# Patient Record
Sex: FEMALE | Race: WHITE | URBAN - METROPOLITAN AREA
[De-identification: names, ages, dates, MRNs, and addresses within clinical notes are randomized per-mention and may not be internally consistent; named-entity substitution may affect disease eponyms.]

---

## 2021-06-23 ENCOUNTER — OFFICE VISIT (OUTPATIENT)
Dept: INTERNAL MEDICINE CLINIC | Age: 21
End: 2021-06-23
Payer: COMMERCIAL

## 2021-06-23 VITALS
SYSTOLIC BLOOD PRESSURE: 132 MMHG | OXYGEN SATURATION: 99 % | HEIGHT: 61 IN | DIASTOLIC BLOOD PRESSURE: 78 MMHG | HEART RATE: 75 BPM | RESPIRATION RATE: 16 BRPM | BODY MASS INDEX: 31.53 KG/M2 | WEIGHT: 167 LBS

## 2021-06-23 DIAGNOSIS — L65.9 HAIR LOSS: Primary | ICD-10-CM

## 2021-06-23 DIAGNOSIS — N92.6 IRREGULAR PERIODS: ICD-10-CM

## 2021-06-23 DIAGNOSIS — K59.01 SLOW TRANSIT CONSTIPATION: ICD-10-CM

## 2021-06-23 PROCEDURE — 99203 OFFICE O/P NEW LOW 30 MIN: CPT | Performed by: INTERNAL MEDICINE

## 2021-06-23 RX ORDER — ISOTRETINOIN 40 MG/1
40 CAPSULE ORAL DAILY
COMMUNITY

## 2021-06-23 SDOH — ECONOMIC STABILITY: INCOME INSECURITY: IN THE LAST 12 MONTHS, WAS THERE A TIME WHEN YOU WERE NOT ABLE TO PAY THE MORTGAGE OR RENT ON TIME?: NO

## 2021-06-23 SDOH — HEALTH STABILITY: PHYSICAL HEALTH: ON AVERAGE, HOW MANY DAYS PER WEEK DO YOU ENGAGE IN MODERATE TO STRENUOUS EXERCISE (LIKE A BRISK WALK)?: 4 DAYS

## 2021-06-23 SDOH — ECONOMIC STABILITY: TRANSPORTATION INSECURITY
IN THE PAST 12 MONTHS, HAS THE LACK OF TRANSPORTATION KEPT YOU FROM MEDICAL APPOINTMENTS OR FROM GETTING MEDICATIONS?: NO

## 2021-06-23 SDOH — HEALTH STABILITY: PHYSICAL HEALTH: ON AVERAGE, HOW MANY MINUTES DO YOU ENGAGE IN EXERCISE AT THIS LEVEL?: 30 MIN

## 2021-06-23 SDOH — ECONOMIC STABILITY: FOOD INSECURITY: WITHIN THE PAST 12 MONTHS, YOU WORRIED THAT YOUR FOOD WOULD RUN OUT BEFORE YOU GOT MONEY TO BUY MORE.: NEVER TRUE

## 2021-06-23 SDOH — ECONOMIC STABILITY: TRANSPORTATION INSECURITY
IN THE PAST 12 MONTHS, HAS LACK OF TRANSPORTATION KEPT YOU FROM MEETINGS, WORK, OR FROM GETTING THINGS NEEDED FOR DAILY LIVING?: NO

## 2021-06-23 SDOH — ECONOMIC STABILITY: FOOD INSECURITY: WITHIN THE PAST 12 MONTHS, THE FOOD YOU BOUGHT JUST DIDN'T LAST AND YOU DIDN'T HAVE MONEY TO GET MORE.: NEVER TRUE

## 2021-06-23 SDOH — ECONOMIC STABILITY: HOUSING INSECURITY
IN THE LAST 12 MONTHS, WAS THERE A TIME WHEN YOU DID NOT HAVE A STEADY PLACE TO SLEEP OR SLEPT IN A SHELTER (INCLUDING NOW)?: NO

## 2021-06-23 ASSESSMENT — SOCIAL DETERMINANTS OF HEALTH (SDOH)
HOW OFTEN DO YOU ATTENT MEETINGS OF THE CLUB OR ORGANIZATION YOU BELONG TO?: NEVER
WITHIN THE LAST YEAR, HAVE TO BEEN RAPED OR FORCED TO HAVE ANY KIND OF SEXUAL ACTIVITY BY YOUR PARTNER OR EX-PARTNER?: NO
HOW HARD IS IT FOR YOU TO PAY FOR THE VERY BASICS LIKE FOOD, HOUSING, MEDICAL CARE, AND HEATING?: NOT HARD AT ALL
DO YOU BELONG TO ANY CLUBS OR ORGANIZATIONS SUCH AS CHURCH GROUPS UNIONS, FRATERNAL OR ATHLETIC GROUPS, OR SCHOOL GROUPS?: NO
WITHIN THE LAST YEAR, HAVE YOU BEEN AFRAID OF YOUR PARTNER OR EX-PARTNER?: NO
WITHIN THE LAST YEAR, HAVE YOU BEEN KICKED, HIT, SLAPPED, OR OTHERWISE PHYSICALLY HURT BY YOUR PARTNER OR EX-PARTNER?: NO
WITHIN THE LAST YEAR, HAVE YOU BEEN HUMILIATED OR EMOTIONALLY ABUSED IN OTHER WAYS BY YOUR PARTNER OR EX-PARTNER?: NO
HOW OFTEN DO YOU ATTEND CHURCH OR RELIGIOUS SERVICES?: NEVER
HOW OFTEN DO YOU GET TOGETHER WITH FRIENDS OR RELATIVES?: THREE TIMES A WEEK
IN A TYPICAL WEEK, HOW MANY TIMES DO YOU TALK ON THE PHONE WITH FAMILY, FRIENDS, OR NEIGHBORS?: THREE TIMES A WEEK

## 2021-06-23 ASSESSMENT — PATIENT HEALTH QUESTIONNAIRE - PHQ9
DEPRESSION UNABLE TO ASSESS: YES
2. FEELING DOWN, DEPRESSED OR HOPELESS: NOT AT ALL
1. LITTLE INTEREST OR PLEASURE IN DOING THINGS: NOT AT ALL
SUM OF ALL RESPONSES TO PHQ9 QUESTIONS 1 & 2: 0
1. LITTLE INTEREST OR PLEASURE IN DOING THINGS: 0
SUM OF ALL RESPONSES TO PHQ QUESTIONS 1-9: 0
SUM OF ALL RESPONSES TO PHQ9 QUESTIONS 1 & 2: 0
2. FEELING DOWN, DEPRESSED OR HOPELESS: 0

## 2021-06-23 ASSESSMENT — LIFESTYLE VARIABLES: HOW OFTEN DO YOU HAVE A DRINK CONTAINING ALCOHOL: NEVER

## 2021-06-23 NOTE — PROGRESS NOTES
[facility] Primary Care  Internal Medicine  New Patient Note  Rocio Nataliemickie, DO      2021    Doreen Sotelo (:  2000) is a 22 y.o. female, here for evaluation of the following medical concerns:      SUBJECTIVE:    HPI     Patient is a 21 yo fm with pmhx of ADHD on no medications who presents secondary to hair loss constipation and to establish care. Patient has noted some acne (she is on accutane by dermatolgist) with constipation and hair loss. She note she saw endocrinology 2/2 this in the past and was told all her labs came back normal. She notes that her periods are also irregular. She will go 5-6 weeks between periods and then some times only 2 weeks. Because of this and her other symptoms she was concerned for PCOS. Patient has not seen a gynecologist.     Dr. Solange Leal pediatricaion --> vaccine hx can be obtain here  Has not had covid-19 vaccine but would like to get soon    She is otherwise feeling well except for the above symptoms. Review of Systems ROS negative except for those noted in the HPI above. Outpatient Medications Marked as Taking for the 22 encounter (Office Visit) with Power Stout, DO   Medication Sig Dispense Refill    ISOtretinoin (ACCUTANE) 40 MG chemo capsule Take 40 mg by mouth daily          No Known Allergies    Past Medical History:   Diagnosis Date    ADHD (attention deficit hyperactivity disorder)        No past surgical history on file.     Social History     Socioeconomic History    Marital status: Single     Spouse name: Not on file    Number of children: Not on file    Years of education: Not on file    Highest education level: Not on file   Occupational History    Not on file   Tobacco Use    Smoking status: Never Smoker    Smokeless tobacco: Never Used   Vaping Use    Vaping Use: Never used   Substance and Sexual Activity    Alcohol use: Never    Drug use: Never    Sexual activity: Never   Other Topics Concern    Not on file   Social History Narrative    Not on file     Social Determinants of Health     Financial Resource Strain: Low Risk     Difficulty of Paying Living Expenses: Not hard at all   Food Insecurity: No Food Insecurity    Worried About Running Out of Food in the Last Year: Never true    [address] in the Last Year: Never true   Transportation Needs: No Transportation Needs    Lack of Transportation (Medical): No    Lack of Transportation (Non-Medical): No   Physical Activity: Insufficiently Active    Days of Exercise per Week: 4 days    Minutes of Exercise per Session: 30 min   Stress: No Stress Concern Present    Feeling of Stress : Not at all   Social Connections: Unknown    Frequency of Communication with Friends and Family: Three times a week    Frequency of Social Gatherings with Friends and Family: Three times a week    Attends Mu-ism Services: Never    Active Member of Clubs or Organizations: No    Attends Club or Organization Meetings: Never    Marital Status: Not on file   Intimate Partner Violence: Not At Risk    Fear of Current or Ex-Partner: No    Emotionally Abused: No    Physically Abused: No    Sexually Abused: No        Family History   Problem Relation Age of Onset    Breast Cancer Paternal Grandmother          OBJECTIVE:    Vitals:    06/23/21 1104   BP: 132/78   Pulse: 75   Resp: 16   SpO2: 99%   Weight: 167 lb (75.8 kg)   Height: 5' 1\" (1.549 m)     Body mass index is 31.55 kg/m². Physical Exam  Constitutional:       General: She is not in acute distress. Appearance: Normal appearance. She is not ill-appearing. HENT:      Head: Normocephalic and atraumatic. Right Ear: External ear normal.      Left Ear: External ear normal.   Eyes:      General: No scleral icterus. Extraocular Movements: Extraocular movements intact. Conjunctiva/sclera: Conjunctivae normal.   Cardiovascular:      Rate and Rhythm: Normal rate and regular rhythm. Pulses: Normal pulses. Heart sounds: No murmur heard. No friction rub. No gallop. Pulmonary:      Effort: Pulmonary effort is normal. No respiratory distress. Breath sounds: Normal breath sounds. No wheezing, rhonchi or rales. Abdominal:      General: Bowel sounds are normal. There is no distension. Palpations: Abdomen is soft. There is no mass. Tenderness: There is no abdominal tenderness. There is no guarding or rebound. Musculoskeletal:         General: No swelling or tenderness. Normal range of motion. Cervical back: Normal range of motion. No rigidity. No muscular tenderness. Lymphadenopathy:      Cervical: No cervical adenopathy. Skin:     General: Skin is warm. Coloration: Skin is not jaundiced. Findings: No bruising, erythema or rash. Neurological:      General: No focal deficit present. Mental Status: She is alert and oriented to person, place, and time. Motor: No weakness. Gait: Gait normal.   Psychiatric:         Mood and Affect: Mood normal.         Behavior: Behavior normal.         ASSESSMENT/PLAN:  1. Hair loss  Hair loss with constipation some fatigue and irregular periods. Will check TSH. Patient also concerned for PCOS, will give referral to gyn for further workup and treatment.   - TSH with Reflex; Future    2. Slow transit constipation  Will check tsh. Patient to start taking miralax more regularly. She has this already at home and has used it in the past.   - TSH with Reflex; Future    3. Irregular periods  Will check thyroid studies, referral to gyn for further evaluation. 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 Return in about 1 year (around 6/23/2022), or if symptoms worsen or fail to improve.      The DO Lewis

## 2021-06-24 DIAGNOSIS — N92.6 IRREGULAR PERIODS: Primary | ICD-10-CM

## 2021-10-20 ENCOUNTER — TELEPHONE (OUTPATIENT)
Dept: INTERNAL MEDICINE CLINIC | Age: 21
End: 2021-10-20

## 2021-10-20 NOTE — TELEPHONE ENCOUNTER
Unfortunately patient will need to schedule an appointment for this.  It has been 3 months since I saw her last.

## 2021-10-20 NOTE — TELEPHONE ENCOUNTER
Per Dr. Regino Falcon, this patient needs to be scheduled for an appointment to get her health forms completed.     Keisha Delatorre

## 2021-10-20 NOTE — TELEPHONE ENCOUNTER
----- Message from Armando Allison sent at 10/20/2021 11:25 AM EDT -----  Subject: Message to Provider    QUESTIONS  Information for Provider? patient has health forms that needs to have   filled out she wants to know if she can drop them off and if her last   appointment could be used as a physical. she also needs a drug test done   and wants to know if she can do that with us   ---------------------------------------------------------------------------  --------------  CALL BACK INFO  What is the best way for the office to contact you? OK to leave message on   voicemail  Preferred Call Back Phone Number? [phone]  ---------------------------------------------------------------------------  --------------  SCRIPT ANSWERS  Relationship to Patient?  Self

## 2021-11-08 ENCOUNTER — OFFICE VISIT (OUTPATIENT)
Dept: INTERNAL MEDICINE CLINIC | Age: 21
End: 2021-11-08
Payer: COMMERCIAL

## 2021-11-08 VITALS
HEART RATE: 73 BPM | SYSTOLIC BLOOD PRESSURE: 118 MMHG | WEIGHT: 165 LBS | BODY MASS INDEX: 31.15 KG/M2 | RESPIRATION RATE: 16 BRPM | HEIGHT: 61 IN | DIASTOLIC BLOOD PRESSURE: 70 MMHG | OXYGEN SATURATION: 98 %

## 2021-11-08 DIAGNOSIS — Z00.00 ANNUAL PHYSICAL EXAM: Primary | ICD-10-CM

## 2021-11-08 PROCEDURE — 99395 PREV VISIT EST AGE 18-39: CPT | Performed by: INTERNAL MEDICINE

## 2021-11-08 NOTE — PROGRESS NOTES
DO Seth  Internal Medicine  Annual Physical/Preventative Medicine Evaluation      Gerda Walsh  YOB: 2000    Date of Service:  11/8/2021    Vitals:    11/08/21 1032   BP: 118/70   Pulse: 73   Resp: 16   SpO2: 98%   Weight: 165 lb (74.8 kg)   Height: 5' 1\" (1.549 m)     Body mass index is 31.18 kg/m². Wt Readings from Last 3 Encounters:   11/08/21 165 lb (74.8 kg)   06/23/21 167 lb (75.8 kg)     BP Readings from Last 3 Encounters:   11/08/21 118/70   06/23/21 132/78       Chief Estefani Bletran is a 22 y.o. female who presents for complete physical examination/Preventative Medicine Evaluation. HPI: Patient is a 72-year-old female with no significant past medical history who presents today for annual physical.    Patient notes that she did see gynecology for concerns of hair loss and questionable PCOS. Patient notes that she does not have a true diagnosis of PCOS at this time. To follow-up with gynecology per them. Patient feels well without any complaints today. 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 There is no problem list on file for this patient. No Known Allergies  Outpatient Medications Marked as Taking for the 11/8/21 encounter (Office Visit) with Neftaly Benavides DO   Medication Sig Dispense Refill    ISOtretinoin (ACCUTANE) 40 MG chemo capsule Take 40 mg by mouth daily         Past Medical History:   Diagnosis Date    ADHD (attention deficit hyperactivity disorder)      No past surgical history on file.   Family History   Problem Relation Age of Onset    Breast Cancer Paternal Grandmother        Preventive Care:  Hx abnormal PAP:patient not of age for PAP  Self-breast exams: no  Eye exam within the past 2 years: no - does wear contacts   Dental exam every 6 months: yes  Seatbelt used consistently: yes  Smoke and carbon monoxidedetectors in home: yes   Exercise: walking 30 minutes/day- 4 days/week Never       Advance Directive: N, <no information>    Immunization History   Administered Date(s) Administered    DTaP, 5 Pertussis Antigens (Daptacel) 2001, 2001, 2001, 2001, 2001, 2001, 02/19/2003, 02/19/2003, 08/02/2006, 08/02/2006    HIB PRP-T (ActHIB, Hiberix) 2001, 2001, 2001, 2001, 2001, 2001, 2001, 2001    Hepatitis A Ped/Adol (Havrix, Vaqta) 12/28/2018, 12/28/2018, 08/24/2020, 08/24/2020    Hepatitis B Ped/Adol (Engerix-B, Recombivax HB) 2001, 2001, 2001, 2001, 2001, 2001    Hib (HbOC) 06/10/2002    MMR 08/28/2002, 08/28/2002, 08/02/2006, 08/02/2006    Meningococcal MCV4P (Menactra) 04/15/2013, 04/15/2013, 12/11/2017, 12/11/2017    Pneumococcal Conjugate 7-valent (Martin Cocking) 2001, 2001, 2001, 2001, 03/11/2002, 03/11/2002, 08/28/2002, 08/28/2002    Polio IPV (IPOL) 2001, 2001, 2001, 2001, 02/19/2003, 02/19/2003, 08/02/2013, 08/02/2013    Tdap (Boostrix, Adacel) 04/15/2013, 04/15/2013    Varicella (Varivax) 06/10/2002, 06/10/2002, 07/07/2008, 07/07/2008       Health Maintenance   Topic Date Due    Hepatitis C screen  Never done    HPV vaccine (1 - 2-dose series) Never done    COVID-19 Vaccine (1) Never done    HIV screen  Never done    Chlamydia screen  Never done    Hepatitis A vaccine (2 of 2 - 2-dose series) 02/24/2021    Flu vaccine (1) Never done    DTaP/Tdap/Td vaccine (7 - Td or Tdap) 04/15/2023    Hepatitis B vaccine  Completed    Hib vaccine  Completed    Varicella vaccine  Completed    Meningococcal (ACWY) vaccine  Completed    Pneumococcal 0-64 years Vaccine  Aged Out          Review of Systems:  Review of Systems ROS negative except for those noted in the HPI above.        Objective    Vitals:    11/08/21 1032   BP: 118/70   Pulse: 73   Resp: 16   SpO2: 98%   Weight: 165 lb (74.8 kg)   Height: 5' 1\" (1.549 m) Body mass index is 31.18 kg/m². Wt Readings from Last 3 Encounters:   11/08/21 165 lb (74.8 kg)   06/23/21 167 lb (75.8 kg)     BP Readings from Last 3 Encounters:   11/08/21 118/70   06/23/21 132/78         Physical Exam  Constitutional:       General: She is not in acute distress. Appearance: Normal appearance. She is not ill-appearing. HENT:      Head: Normocephalic and atraumatic. Right Ear: External ear normal.      Left Ear: External ear normal.   Eyes:      General: No scleral icterus. Extraocular Movements: Extraocular movements intact. Conjunctiva/sclera: Conjunctivae normal.   Cardiovascular:      Rate and Rhythm: Normal rate and regular rhythm. Pulses: Normal pulses. Heart sounds: No murmur heard. No friction rub. No gallop. Pulmonary:      Effort: Pulmonary effort is normal. No respiratory distress. Breath sounds: Normal breath sounds. No wheezing, rhonchi or rales. Abdominal:      General: Bowel sounds are normal. There is no distension. Palpations: Abdomen is soft. There is no mass. Tenderness: There is no abdominal tenderness. There is no guarding or rebound. Musculoskeletal:      Cervical back: Normal range of motion. No muscular tenderness. Right lower leg: No edema. Left lower leg: No edema. Lymphadenopathy:      Cervical: No cervical adenopathy. Skin:     General: Skin is warm. Findings: No erythema or rash. Neurological:      General: No focal deficit present. Mental Status: She is alert and oriented to person, place, and time. Psychiatric:         Mood and Affect: Mood normal.         Behavior: Behavior normal.         Lipid panel: No results found for: CHOL, TRIG, HDL, LDLCALC, LDLDIRECT  Glucose: No results found for: GLUCOSE    No results found for this visit on 11/08/21.     Assessment/Plan:  Jaylyn Lemus was seen today for annual exam.    Diagnoses and all orders for this visit:    Annual physical exam  Patient eating healthy diet and exercising frequently  Patient is using sunscreen consistently  Seatbelt use yes  Home safety reviewed including fire alarms and carbon monoxide alarms in the home  Will obtain QuantiFERON gold test secondary to requirement for nursing school  Patient is due for COVID-19 vaccine and flu vaccine -planning to get Covid vaccine this week. Patient would like to hold on flu until after Covid vaccine. Patient denies HIV and hepatitis C screening today  Paperwork completed  -     Quantiferon TB Gold; Future          No follow-ups on file.     The DO Lewis

## 2021-11-15 ENCOUNTER — TELEPHONE (OUTPATIENT)
Dept: INTERNAL MEDICINE CLINIC | Age: 21
End: 2021-11-15

## 2021-11-15 NOTE — TELEPHONE ENCOUNTER
----- Message from Alma Welsh sent at 11/15/2021 10:37 AM EST -----  Subject: Message to Provider    QUESTIONS  Information for Provider? Pt called and said she is wondering if her   physical form and [employer] respirator form is ready to be picked up. SHe said   it was due today 11/15.  ---------------------------------------------------------------------------  --------------  CALL BACK INFO  What is the best way for the office to contact you? OK to leave message on   voicemail  Preferred Call Back Phone Number? [phone]  ---------------------------------------------------------------------------  --------------  SCRIPT ANSWERS  Relationship to Patient?  Self

## 2021-11-19 ENCOUNTER — TELEPHONE (OUTPATIENT)
Dept: INTERNAL MEDICINE CLINIC | Age: 21
End: 2021-11-19

## 2022-02-14 ENCOUNTER — TELEPHONE (OUTPATIENT)
Dept: INTERNAL MEDICINE CLINIC | Age: 22
End: 2022-02-14

## 2022-02-14 NOTE — TELEPHONE ENCOUNTER
Patient is calling back about a form from [id number]. There is an encounter in her chart she has come pick this forms up but now she states she needs to have the [employer] form signed by the doctor as she has just now discovered that it was not signed. Please advise as [place] and would need this signed and scanned back to her chart.

## 2022-02-14 NOTE — TELEPHONE ENCOUNTER
Left message for patient to call. If patient calls I need more information.   Exactly what page needs a signature because if you look in the media section all signature lines are signed

## 2022-02-14 NOTE — TELEPHONE ENCOUNTER
Patient called back stating that she is not exactly sure what they are asking for but they told her to bring the [employer] form back signed by her provider. She will reach out to their office to see exactly what they need and give us a call back.

## 2022-11-21 ENCOUNTER — NURSE ONLY (OUTPATIENT)
Dept: INTERNAL MEDICINE CLINIC | Age: 22
End: 2022-11-21
Payer: COMMERCIAL

## 2022-11-21 ENCOUNTER — TELEPHONE (OUTPATIENT)
Dept: INTERNAL MEDICINE CLINIC | Age: 22
End: 2022-11-21

## 2022-11-21 PROCEDURE — 86580 TB INTRADERMAL TEST: CPT | Performed by: INTERNAL MEDICINE

## 2022-11-21 NOTE — TELEPHONE ENCOUNTER
Patient requesting an appt for a TB test.  Would need to be today so it can be read on Wednesday. Ok to schedule?      ----- Message from Aaron Miller sent at 11/21/2022  8:54 AM EST -----  Subject: Appointment Request    Reason for Call: Established Patient Appointment needed: Urgent (Patient   Request) No Script    QUESTIONS    Reason for appointment request? No appointments available during search     Additional Information for Provider? patient stated that she needs a TB test for school .  She will only be in town until next Monday.  ---------------------------------------------------------------------------  --------------  [id number]  [phone]; OK to leave message on voicemail  ---------------------------------------------------------------------------  --------------  SCRIPT ANSWERS  COVID Screen: Maria Del Carmen Garcia

## 2022-11-23 ENCOUNTER — NURSE ONLY (OUTPATIENT)
Dept: INTERNAL MEDICINE CLINIC | Age: 22
End: 2022-11-23

## 2022-11-23 LAB
INDURATION: 0
TB SKIN TEST: NEGATIVE